# Patient Record
Sex: FEMALE | Race: WHITE | NOT HISPANIC OR LATINO
[De-identification: names, ages, dates, MRNs, and addresses within clinical notes are randomized per-mention and may not be internally consistent; named-entity substitution may affect disease eponyms.]

---

## 2018-04-30 PROBLEM — Z00.00 ENCOUNTER FOR PREVENTIVE HEALTH EXAMINATION: Status: ACTIVE | Noted: 2018-04-30

## 2018-05-18 ENCOUNTER — RECORD ABSTRACTING (OUTPATIENT)
Age: 68
End: 2018-05-18

## 2018-05-18 DIAGNOSIS — Z98.82 BREAST IMPLANT STATUS: ICD-10-CM

## 2018-05-18 DIAGNOSIS — Z87.19 PERSONAL HISTORY OF OTHER DISEASES OF THE DIGESTIVE SYSTEM: ICD-10-CM

## 2018-05-18 DIAGNOSIS — Z78.9 OTHER SPECIFIED HEALTH STATUS: ICD-10-CM

## 2018-05-18 DIAGNOSIS — Z85.3 PERSONAL HISTORY OF MALIGNANT NEOPLASM OF BREAST: ICD-10-CM

## 2018-05-18 DIAGNOSIS — Z86.79 PERSONAL HISTORY OF OTHER DISEASES OF THE CIRCULATORY SYSTEM: ICD-10-CM

## 2018-05-18 RX ORDER — MULTIVIT-MIN/FOLIC/VIT K/LYCOP 400-300MCG
50 MCG TABLET ORAL
Refills: 0 | Status: ACTIVE | COMMUNITY

## 2018-05-18 RX ORDER — MULTIVITAMIN
TABLET ORAL
Refills: 0 | Status: ACTIVE | COMMUNITY

## 2018-05-18 RX ORDER — AMLODIPINE BESYLATE 5 MG/1
5 TABLET ORAL DAILY
Refills: 0 | Status: ACTIVE | COMMUNITY

## 2018-05-18 RX ORDER — ASPIRIN 81 MG
81 TABLET, DELAYED RELEASE (ENTERIC COATED) ORAL DAILY
Refills: 0 | Status: ACTIVE | COMMUNITY

## 2018-05-18 RX ORDER — DEXLANSOPRAZOLE 60 MG/1
60 CAPSULE, DELAYED RELEASE ORAL DAILY
Refills: 0 | Status: ACTIVE | COMMUNITY

## 2018-05-18 RX ORDER — CALCIUM CARBONATE 500(1250)
500 TABLET ORAL
Refills: 0 | Status: ACTIVE | COMMUNITY

## 2018-06-13 ENCOUNTER — APPOINTMENT (OUTPATIENT)
Dept: BREAST CENTER | Facility: CLINIC | Age: 68
End: 2018-06-13
Payer: MEDICARE

## 2018-06-13 VITALS
WEIGHT: 136 LBS | SYSTOLIC BLOOD PRESSURE: 141 MMHG | BODY MASS INDEX: 26.7 KG/M2 | HEIGHT: 60 IN | HEART RATE: 64 BPM | DIASTOLIC BLOOD PRESSURE: 86 MMHG

## 2018-06-13 DIAGNOSIS — Z80.51 FAMILY HISTORY OF MALIGNANT NEOPLASM OF KIDNEY: ICD-10-CM

## 2018-06-13 PROCEDURE — 99214 OFFICE O/P EST MOD 30 MIN: CPT

## 2018-06-13 RX ORDER — CHLORHEXIDINE GLUCONATE 4 %
1000 LIQUID (ML) TOPICAL
Refills: 0 | Status: DISCONTINUED | COMMUNITY
End: 2018-06-13

## 2018-12-05 ENCOUNTER — APPOINTMENT (OUTPATIENT)
Dept: BREAST CENTER | Facility: CLINIC | Age: 68
End: 2018-12-05
Payer: MEDICARE

## 2018-12-05 VITALS
HEART RATE: 63 BPM | WEIGHT: 138 LBS | DIASTOLIC BLOOD PRESSURE: 82 MMHG | SYSTOLIC BLOOD PRESSURE: 147 MMHG | BODY MASS INDEX: 27.09 KG/M2 | HEIGHT: 60 IN

## 2018-12-05 PROCEDURE — 99214 OFFICE O/P EST MOD 30 MIN: CPT

## 2018-12-05 RX ORDER — ESTRADIOL 10 UG/1
10 INSERT VAGINAL
Refills: 0 | Status: DISCONTINUED | COMMUNITY
End: 2018-12-05

## 2019-06-12 ENCOUNTER — APPOINTMENT (OUTPATIENT)
Dept: BREAST CENTER | Facility: CLINIC | Age: 69
End: 2019-06-12
Payer: MEDICARE

## 2019-06-12 VITALS
HEIGHT: 60 IN | WEIGHT: 140 LBS | SYSTOLIC BLOOD PRESSURE: 132 MMHG | HEART RATE: 64 BPM | BODY MASS INDEX: 27.48 KG/M2 | DIASTOLIC BLOOD PRESSURE: 83 MMHG

## 2019-06-12 PROCEDURE — 99214 OFFICE O/P EST MOD 30 MIN: CPT

## 2019-06-12 NOTE — HISTORY OF PRESENT ILLNESS
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 GM 50's)\par Sister dx'ed w/ MRE11A VUS.\par BRCA -\par Developed R frozen shoulder (12/15) now improved.\par S/P Exc L temporal BCC (11/16)\par +h/o P. Vera > on hydroxyurea > plts stable\par Was on Vagifem > d/c'ed after Cece Evelia Vag Rejuv > so far feels better\par S/P single episode of brief loss of site OD. Extensive W/U -. On ASAb and hydroxyurea for hi plts.\par No other MH/FH change. ROS reviewed/discussed. Taking calcium and Vit D. Vit D level (201): 55. Last Bone Densitometry (2018): +osteopenia, stable

## 2019-06-12 NOTE — PHYSICAL EXAM
[Normocephalic] : normocephalic [Atraumatic] : atraumatic [Supple] : supple [No Cervical Adenopathy] : no cervical adenopathy [No Supraclavicular Adenopathy] : no supraclavicular adenopathy [Examined in the supine and seated position] : examined in the supine and seated position [No Thyromegaly] : no thyromegaly [Normal Sinus Rhythm] : normal sinus rhythm [No dominant masses] : no dominant masses in right breast  [No dominant masses] : no dominant masses left breast [No Nipple Retraction] : no left nipple retraction [No Nipple Discharge] : no left nipple discharge [No Axillary Lymphadenopathy] : no left axillary lymphadenopathy [No Edema] : no edema [No Ulceration] : no ulceration [No Rashes] : no rashes [de-identified] : S/P MX/Implt w/o rec. %. No lymphedema.  [de-identified] : S/P MX/Ax/Implt w/o rec. %. No lymphedema.

## 2019-12-11 ENCOUNTER — APPOINTMENT (OUTPATIENT)
Dept: BREAST CENTER | Facility: CLINIC | Age: 69
End: 2019-12-11
Payer: MEDICARE

## 2019-12-11 VITALS
HEIGHT: 60 IN | HEART RATE: 70 BPM | BODY MASS INDEX: 27.48 KG/M2 | SYSTOLIC BLOOD PRESSURE: 136 MMHG | WEIGHT: 140 LBS | DIASTOLIC BLOOD PRESSURE: 81 MMHG

## 2019-12-11 DIAGNOSIS — Z87.898 PERSONAL HISTORY OF OTHER SPECIFIED CONDITIONS: ICD-10-CM

## 2019-12-11 PROCEDURE — 99214 OFFICE O/P EST MOD 30 MIN: CPT

## 2019-12-11 NOTE — PHYSICAL EXAM
[Normocephalic] : normocephalic [No Supraclavicular Adenopathy] : no supraclavicular adenopathy [Atraumatic] : atraumatic [Supple] : supple [No Cervical Adenopathy] : no cervical adenopathy [No Thyromegaly] : no thyromegaly [Examined in the supine and seated position] : examined in the supine and seated position [No dominant masses] : no dominant masses in right breast  [Normal Sinus Rhythm] : normal sinus rhythm [No Nipple Retraction] : no right nipple retraction [No dominant masses] : no dominant masses left breast [No Nipple Discharge] : no right nipple discharge [No Axillary Lymphadenopathy] : no left axillary lymphadenopathy [No Edema] : no edema [No Rashes] : no rashes [No Ulceration] : no ulceration [de-identified] : S/P MX/Ax/Implt w/o rec. %. No lymphedema.  [de-identified] : S/P MX/Implt w/o susp fx's. %. No lymphedema.

## 2019-12-11 NOTE — HISTORY OF PRESENT ILLNESS
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 GM 50's)\par Sister dx'ed w/ MRE11A VUS.\par BRCA -\par Developed R frozen shoulder (12/15) now improved.\par S/P Exc L temporal BCC (11/16)\par +h/o P. Vera > on hydroxyurea > plts stable\par Was on Vagifem > d/c'ed after Cece Evelia Vag Rejuv > so far feels better\par S/P single episode of brief loss of site OD. Extensive W/U -. On ASAb and hydroxyurea for hi plts.\par Sx'ed w/ PVC's (12/19)> Cardiac w/u > No new Rx warranted\par No other MH/FH change. ROS reviewed/discussed. Taking calcium and Vit D. Vit D level (201): 55. Last Bone Densitometry (2018): +osteopenia, stable

## 2020-06-10 ENCOUNTER — APPOINTMENT (OUTPATIENT)
Dept: BREAST CENTER | Facility: CLINIC | Age: 70
End: 2020-06-10
Payer: MEDICARE

## 2020-06-10 VITALS
WEIGHT: 140 LBS | HEIGHT: 60 IN | HEART RATE: 61 BPM | DIASTOLIC BLOOD PRESSURE: 78 MMHG | BODY MASS INDEX: 27.48 KG/M2 | SYSTOLIC BLOOD PRESSURE: 123 MMHG

## 2020-06-10 PROCEDURE — 99214 OFFICE O/P EST MOD 30 MIN: CPT

## 2020-06-10 RX ORDER — HYDROXYUREA 500 MG/1
500 CAPSULE ORAL DAILY
Refills: 0 | Status: DISCONTINUED | COMMUNITY
End: 2020-06-10

## 2020-06-10 NOTE — PHYSICAL EXAM
[Normocephalic] : normocephalic [Atraumatic] : atraumatic [Supple] : supple [No Supraclavicular Adenopathy] : no supraclavicular adenopathy [No Cervical Adenopathy] : no cervical adenopathy [No Thyromegaly] : no thyromegaly [Normal Sinus Rhythm] : normal sinus rhythm [Examined in the supine and seated position] : examined in the supine and seated position [No dominant masses] : no dominant masses in right breast  [No dominant masses] : no dominant masses left breast [No Nipple Retraction] : no left nipple retraction [No Nipple Discharge] : no left nipple discharge [No Axillary Lymphadenopathy] : no left axillary lymphadenopathy [No Edema] : no edema [No Rashes] : no rashes [No Ulceration] : no ulceration [de-identified] : S/P MX/Implt w/osusp fx's. %. No lymphedema.  [de-identified] : S/P MX/Ax/Implt w/o rec. %. No lymphedema.

## 2020-06-10 NOTE — HISTORY OF PRESENT ILLNESS
[FreeTextEntry1] : S/P R proph MX/Implt (8/17/98): No Ca\par S/P L MRM/implt (11/10/97)([facility]): +1.2cm IDCA/DCIS, -0/15 LN, ER+, [place]+, aneuploid\par L Stage IA IDCA\par S/P CMF ([facility])\par S/P tmx\par S/P AI\par +FH Br Ca (Niece 36, M. GM 50's)\par Sister dx'ed w/ MRE11A VUS.\par BRCA -\par Developed R frozen shoulder (12/15) now improved.\par S/P Exc L temporal BCC (11/16)\par +h/o P. Vera > on hydroxyurea > plts stable\par Was on Vagifem > d/c'ed after Cece Evelia Vag Rejuv > so far feels better\par S/P single episode of brief loss of site OD. Extensive W/U -. On ASAb and hydroxyurea for hi plts.\par Sx'ed w/ PVC's (12/19)> Cardiac w/u > No new Rx warranted\par No other MH/FH change. ROS reviewed/discussed. Taking calcium and Vit D. Vit D level (201): 55. Last Bone Densitometry (2018): +osteopenia, stable

## 2020-12-09 ENCOUNTER — APPOINTMENT (OUTPATIENT)
Dept: BREAST CENTER | Facility: CLINIC | Age: 70
End: 2020-12-09
Payer: MEDICARE

## 2020-12-09 VITALS
SYSTOLIC BLOOD PRESSURE: 164 MMHG | BODY MASS INDEX: 27.88 KG/M2 | HEART RATE: 81 BPM | WEIGHT: 142 LBS | HEIGHT: 60 IN | DIASTOLIC BLOOD PRESSURE: 74 MMHG

## 2020-12-09 DIAGNOSIS — Z12.31 ENCOUNTER FOR SCREENING MAMMOGRAM FOR MALIGNANT NEOPLASM OF BREAST: ICD-10-CM

## 2020-12-09 PROCEDURE — 99214 OFFICE O/P EST MOD 30 MIN: CPT

## 2020-12-09 RX ORDER — LISINOPRIL 10 MG/1
10 TABLET ORAL
Refills: 0 | Status: ACTIVE | COMMUNITY

## 2020-12-09 RX ORDER — METOPROLOL SUCCINATE 100 MG/1
100 TABLET, EXTENDED RELEASE ORAL
Refills: 0 | Status: ACTIVE | COMMUNITY

## 2020-12-09 NOTE — PHYSICAL EXAM
[Normocephalic] : normocephalic [Atraumatic] : atraumatic [Supple] : supple [No Supraclavicular Adenopathy] : no supraclavicular adenopathy [No Cervical Adenopathy] : no cervical adenopathy [No Thyromegaly] : no thyromegaly [Normal Sinus Rhythm] : normal sinus rhythm [Examined in the supine and seated position] : examined in the supine and seated position [No dominant masses] : no dominant masses in right breast  [No dominant masses] : no dominant masses left breast [No Nipple Retraction] : no left nipple retraction [No Nipple Discharge] : no left nipple discharge [No Axillary Lymphadenopathy] : no left axillary lymphadenopathy [No Edema] : no edema [No Rashes] : no rashes [No Ulceration] : no ulceration [de-identified] : S/P MX/Implant w/o susp fx's. %. No lymphedema  [de-identified] : S/P MX/Ax/Implt w/o rec. %. No lymphedema.

## 2020-12-09 NOTE — HISTORY OF PRESENT ILLNESS
[FreeTextEntry1] : S/P R proph MX/Implt (8/17/98): No Ca\par S/P L MRM/implt (11/10/97)([facility]): +1.2cm IDCA/DCIS, -0/15 LN, ER+, [place]+, aneuploid\par L Stage IA IDCA\par S/P CMF ([facility])\par S/P tmx\par S/P AI\par +FH Br Ca (Niece 36, M. GM 50's)\par Sister dx'ed w/ MRE11A VUS.\par BRCA -\par Developed R frozen shoulder (12/15) now improved.\par S/P Exc L temporal BCC (11/16)\par +h/o P. Vera > on hydroxyurea > plts stable\par Was on Vagifem > d/c'ed after Ecce Evelia Vag Rejuv > so far feels better\par S/P single episode of brief loss of site OD. Extensive W/U -. On ASAb and hydroxyurea for hi plts.\par Sx'ed w/ PVC's (12/19)> Cardiac w/u > No new Rx warranted\par No other MH/FH change. ROS reviewed/discussed. Taking calcium and Vit D. Vit D level (201): 55. Last Bone Densitometry (2018): +osteopenia, stable

## 2021-06-09 ENCOUNTER — NON-APPOINTMENT (OUTPATIENT)
Age: 71
End: 2021-06-09

## 2021-06-09 ENCOUNTER — APPOINTMENT (OUTPATIENT)
Dept: BREAST CENTER | Facility: CLINIC | Age: 71
End: 2021-06-09
Payer: MEDICARE

## 2021-06-09 VITALS
HEIGHT: 60 IN | BODY MASS INDEX: 26.11 KG/M2 | WEIGHT: 133 LBS | HEART RATE: 65 BPM | DIASTOLIC BLOOD PRESSURE: 74 MMHG | SYSTOLIC BLOOD PRESSURE: 111 MMHG

## 2021-06-09 PROCEDURE — 99214 OFFICE O/P EST MOD 30 MIN: CPT

## 2021-06-09 NOTE — PHYSICAL EXAM
[Normocephalic] : normocephalic [Atraumatic] : atraumatic [Supple] : supple [No Supraclavicular Adenopathy] : no supraclavicular adenopathy [No Cervical Adenopathy] : no cervical adenopathy [No Thyromegaly] : no thyromegaly [Normal Sinus Rhythm] : normal sinus rhythm [Examined in the supine and seated position] : examined in the supine and seated position [No dominant masses] : no dominant masses in right breast  [No dominant masses] : no dominant masses left breast [No Nipple Retraction] : no left nipple retraction [No Nipple Discharge] : no left nipple discharge [No Axillary Lymphadenopathy] : no left axillary lymphadenopathy [No Edema] : no edema [No Rashes] : no rashes [No Ulceration] : no ulceration [de-identified] : S/P MX/Implant w/o susp fx's. %. No lymphedema  [de-identified] : S/P MX/Ax/Implt w/o rec. %. No lymphedema.

## 2021-12-08 ENCOUNTER — APPOINTMENT (OUTPATIENT)
Dept: BREAST CENTER | Facility: CLINIC | Age: 71
End: 2021-12-08
Payer: MEDICARE

## 2021-12-08 VITALS
HEIGHT: 60 IN | DIASTOLIC BLOOD PRESSURE: 76 MMHG | BODY MASS INDEX: 24.54 KG/M2 | SYSTOLIC BLOOD PRESSURE: 122 MMHG | WEIGHT: 125 LBS

## 2021-12-08 PROCEDURE — 99214 OFFICE O/P EST MOD 30 MIN: CPT

## 2021-12-08 NOTE — HISTORY OF PRESENT ILLNESS
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 GM 50's)\par Sister dx'ed w/ MRE11A VUS.\par BRCA -\par Developed R frozen shoulder (12/15) now improved.\par S/P Exc L temporal BCC (11/16)\par +h/o P. Vera > on hydroxyurea > plts stable\par Was on Vagifem > d/c'ed after Cece Evelia Vag Rejuv > so far feels better > now interested in resuming vag estrogen\par S/P single episode of brief loss of site OD. Extensive W/U -. On ASAb and hydroxyurea for hi plts.\par Sx'ed w/ PVC's (12/19)> Cardiac w/u > No new Rx warranted\par Dx'ed w/ myelofibrosis (7/210 > on Jakafi\par Got Moderna booster (8/21)([facility])\par No other MH/FH change. ROS reviewed/discussed. Taking calcium and Vit D. Vit D level (201): 55. Last Bone Densitometry (2018): +osteopenia, stable

## 2021-12-08 NOTE — PHYSICAL EXAM
[Normocephalic] : normocephalic [Atraumatic] : atraumatic [Supple] : supple [No Supraclavicular Adenopathy] : no supraclavicular adenopathy [No Cervical Adenopathy] : no cervical adenopathy [No Thyromegaly] : no thyromegaly [Normal Sinus Rhythm] : normal sinus rhythm [Examined in the supine and seated position] : examined in the supine and seated position [No dominant masses] : no dominant masses in right breast  [No dominant masses] : no dominant masses left breast [No Nipple Retraction] : no left nipple retraction [No Nipple Discharge] : no left nipple discharge [No Axillary Lymphadenopathy] : no left axillary lymphadenopathy [No Edema] : no edema [No Rashes] : no rashes [No Ulceration] : no ulceration [de-identified] : S/P MX/Implant w/o susp fx's. %. No lymphedema  [de-identified] : S/P MX/Ax/Implt w/o rec. %. No lymphedema.

## 2022-06-08 ENCOUNTER — APPOINTMENT (OUTPATIENT)
Dept: BREAST CENTER | Facility: CLINIC | Age: 72
End: 2022-06-08
Payer: MEDICARE

## 2022-06-08 VITALS — BODY MASS INDEX: 26.11 KG/M2 | WEIGHT: 133 LBS | HEIGHT: 60 IN

## 2022-06-08 PROCEDURE — 99214 OFFICE O/P EST MOD 30 MIN: CPT

## 2022-06-08 RX ORDER — HYDROCORTISONE ACETATE 25 MG/1
25 SUPPOSITORY RECTAL
Qty: 12 | Refills: 0 | Status: ACTIVE | COMMUNITY
Start: 2021-04-14

## 2022-06-08 RX ORDER — RUXOLITINIB 10 MG/1
10 TABLET ORAL
Qty: 60 | Refills: 0 | Status: ACTIVE | COMMUNITY
Start: 2022-05-31

## 2022-06-08 RX ORDER — RUXOLITINIB 10 MG/1
10 TABLET ORAL
Refills: 0 | Status: ACTIVE | COMMUNITY

## 2022-06-08 RX ORDER — HYDROXYUREA 500 MG
0 CAPSULE ORAL
Refills: 0 | Status: DISCONTINUED | COMMUNITY
End: 2022-06-08

## 2022-06-08 RX ORDER — ALPRAZOLAM 0.25 MG/1
0.25 TABLET ORAL
Qty: 30 | Refills: 0 | Status: ACTIVE | COMMUNITY
Start: 2022-04-07

## 2022-06-08 RX ORDER — HYDROCORTISONE ACETATE, PRAMOXINE HCL 2.5; 1 G/100G; G/100G
2.5-1 CREAM TOPICAL
Qty: 30 | Refills: 0 | Status: ACTIVE | COMMUNITY
Start: 2021-04-14

## 2022-06-08 RX ORDER — ATORVASTATIN CALCIUM 10 MG/1
10 TABLET, FILM COATED ORAL
Qty: 90 | Refills: 0 | Status: ACTIVE | COMMUNITY
Start: 2022-03-10

## 2022-06-08 NOTE — PHYSICAL EXAM
[Normocephalic] : normocephalic [Atraumatic] : atraumatic [Supple] : supple [No Supraclavicular Adenopathy] : no supraclavicular adenopathy [No Cervical Adenopathy] : no cervical adenopathy [No Thyromegaly] : no thyromegaly [Normal Sinus Rhythm] : normal sinus rhythm [Examined in the supine and seated position] : examined in the supine and seated position [No dominant masses] : no dominant masses in right breast  [No dominant masses] : no dominant masses left breast [No Nipple Retraction] : no left nipple retraction [No Nipple Discharge] : no left nipple discharge [No Axillary Lymphadenopathy] : no left axillary lymphadenopathy [No Edema] : no edema [No Rashes] : no rashes [No Ulceration] : no ulceration [de-identified] : S/P MX/Implant w/o susp fx's. %. No lymphedema  [de-identified] : S/P MX/Ax/Implt w/o rec. %. No lymphedema.

## 2022-06-08 NOTE — HISTORY OF PRESENT ILLNESS
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 GM 50's)\par Sister dx'ed w/ MRE11A VUS.\par BRCA ([facility])(2/06):  -\par Developed R frozen shoulder (12/15) now improved.\par S/P Exc L temporal BCC (11/16)\par +h/o P. Vera > on hydroxyurea > plts stable\par Was on Vagifem > d/c'ed after Cece Evelia Vag Rejuv > so far feels better > now interested in resuming vag estrogen\par S/P single episode of brief loss of site OD. Extensive W/U -. On ASAb and hydroxyurea for hi plts.\par Sx'ed w/ PVC's (12/19)> Cardiac w/u > No new Rx warranted\par Dx'ed w/ myelofibrosis (7/210 > on Jakafi\par Got second Moderna booster (5/22)([facility])\par No other MH/FH change. ROS reviewed/discussed. Taking calcium and Vit D. Vit D level (201): 55. Last Bone Densitometry (2018): +osteopenia, stable

## 2022-12-08 ENCOUNTER — APPOINTMENT (OUTPATIENT)
Dept: BREAST CENTER | Facility: CLINIC | Age: 72
End: 2022-12-08

## 2022-12-08 VITALS
DIASTOLIC BLOOD PRESSURE: 79 MMHG | SYSTOLIC BLOOD PRESSURE: 141 MMHG | HEIGHT: 60 IN | WEIGHT: 139 LBS | HEART RATE: 61 BPM | BODY MASS INDEX: 27.29 KG/M2

## 2022-12-08 DIAGNOSIS — D45 POLYCYTHEMIA VERA: ICD-10-CM

## 2022-12-08 DIAGNOSIS — N95.2 POSTMENOPAUSAL ATROPHIC VAGINITIS: ICD-10-CM

## 2022-12-08 DIAGNOSIS — C50.912 MALIGNANT NEOPLASM OF UNSPECIFIED SITE OF LEFT FEMALE BREAST: ICD-10-CM

## 2022-12-08 DIAGNOSIS — Z92.21 PERSONAL HISTORY OF ANTINEOPLASTIC CHEMOTHERAPY: ICD-10-CM

## 2022-12-08 DIAGNOSIS — M85.9 DISORDER OF BONE DENSITY AND STRUCTURE, UNSPECIFIED: ICD-10-CM

## 2022-12-08 DIAGNOSIS — Z98.82 BREAST IMPLANT STATUS: ICD-10-CM

## 2022-12-08 DIAGNOSIS — Z80.3 FAMILY HISTORY OF MALIGNANT NEOPLASM OF BREAST: ICD-10-CM

## 2022-12-08 DIAGNOSIS — D75.81 MYELOFIBROSIS: ICD-10-CM

## 2022-12-08 PROCEDURE — 99214 OFFICE O/P EST MOD 30 MIN: CPT

## 2022-12-08 RX ORDER — DILTIAZEM HYDROCHLORIDE 180 MG/1
180 TABLET, EXTENDED RELEASE ORAL
Qty: 90 | Refills: 0 | Status: ACTIVE | COMMUNITY
Start: 2022-10-07

## 2022-12-08 RX ORDER — METOPROLOL TARTRATE 50 MG/1
50 TABLET, FILM COATED ORAL
Qty: 90 | Refills: 0 | Status: ACTIVE | COMMUNITY
Start: 2022-11-29

## 2022-12-08 RX ORDER — TOBRAMYCIN AND DEXAMETHASONE 3; 1 MG/ML; MG/ML
0.3-0.1 SUSPENSION/ DROPS OPHTHALMIC
Qty: 5 | Refills: 0 | Status: ACTIVE | COMMUNITY
Start: 2022-06-25

## 2022-12-08 NOTE — PHYSICAL EXAM
[Normocephalic] : normocephalic [Atraumatic] : atraumatic [Supple] : supple [No Supraclavicular Adenopathy] : no supraclavicular adenopathy [No Cervical Adenopathy] : no cervical adenopathy [No Thyromegaly] : no thyromegaly [Normal Sinus Rhythm] : normal sinus rhythm [Examined in the supine and seated position] : examined in the supine and seated position [No dominant masses] : no dominant masses in right breast  [No dominant masses] : no dominant masses left breast [No Nipple Retraction] : no left nipple retraction [No Nipple Discharge] : no left nipple discharge [No Axillary Lymphadenopathy] : no left axillary lymphadenopathy [No Edema] : no edema [No Rashes] : no rashes [No Ulceration] : no ulceration [de-identified] : S/P MX/Implant w/o susp fx's. %. No lymphedema  [de-identified] : S/P MRM/Implt w/o rec. %. No lymphedema.

## 2022-12-08 NOTE — HISTORY OF PRESENT ILLNESS
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 GM 50's)\par Niece w/ rec BR Ca  req Mx ([place])\par Sister dx'ed w/ MRE11A VUS.\par BRCA ([facility] [facility])(2/06):  -\par Developed R frozen shoulder (12/15) now improved.\par S/P Exc L temporal BCC (11/16)\par +h/o P. Vera > on hydroxyurea > plts stable\par Was on Vagifem > d/c'ed after Cece Evelia Vag Rejuv > so far feels better > now interested in resuming vag estrogen\par S/P single episode of brief loss of site OD. Extensive W/U -. On ASAb and hydroxyurea for hi plts.\par Sx'ed w/ PVC's (12/19)> Cardiac w/u > No new Rx warranted\par Dx'ed w/ myelofibrosis (7/210 > on Jakafi\par Got Moderna bivalent (10/22)\par No other MH/FH change. ROS reviewed/discussed. Taking calcium and Vit D. Vit D level (201): 55. Last Bone Densitometry (2018): +osteopenia, stable

## 2023-06-01 NOTE — HISTORY OF PRESENT ILLNESS
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 GM 50's)\par Sister dx'ed w/ MRE11A VUS.\par BRCA -\par Developed R frozen shoulder (12/15) now improved.\par S/P Exc L temporal BCC (11/16)\par +h/o P. Vera > on hydroxyurea > plts stable\par Was on Vagifem > d/c'ed after Cece Evelia Vag Rejuv > so far feels better > now interested in resuming vag estrogen\par S/P single episode of brief loss of site OD. Extensive W/U -. On ASAb and hydroxyurea for hi plts.\par Sx'ed w/ PVC's (12/19)> Cardiac w/u > No new Rx warranted\par Got second Moderna (2/21)([facility])\par No other MH/FH change. ROS reviewed/discussed. Taking calcium and Vit D. Vit D level (201): 55. Last Bone Densitometry (2018): +osteopenia, stable [phone]

## 2023-06-14 ENCOUNTER — APPOINTMENT (OUTPATIENT)
Dept: BREAST CENTER | Facility: CLINIC | Age: 73
End: 2023-06-14

## 2023-12-06 ENCOUNTER — APPOINTMENT (OUTPATIENT)
Dept: BREAST CENTER | Facility: CLINIC | Age: 73
End: 2023-12-06